# Patient Record
Sex: MALE | Race: WHITE | NOT HISPANIC OR LATINO | Employment: OTHER | URBAN - NONMETROPOLITAN AREA
[De-identification: names, ages, dates, MRNs, and addresses within clinical notes are randomized per-mention and may not be internally consistent; named-entity substitution may affect disease eponyms.]

---

## 2023-06-05 ENCOUNTER — OFFICE VISIT (OUTPATIENT)
Dept: PRIMARY CARE | Facility: CLINIC | Age: 68
End: 2023-06-05
Payer: COMMERCIAL

## 2023-06-05 VITALS
HEART RATE: 55 BPM | SYSTOLIC BLOOD PRESSURE: 120 MMHG | WEIGHT: 248 LBS | OXYGEN SATURATION: 96 % | DIASTOLIC BLOOD PRESSURE: 70 MMHG

## 2023-06-05 DIAGNOSIS — R35.0 URINARY FREQUENCY: Primary | ICD-10-CM

## 2023-06-05 DIAGNOSIS — L72.3 SEBACEOUS CYST: ICD-10-CM

## 2023-06-05 DIAGNOSIS — R35.1 NOCTURIA: ICD-10-CM

## 2023-06-05 PROBLEM — J44.9 COPD (CHRONIC OBSTRUCTIVE PULMONARY DISEASE) (MULTI): Status: ACTIVE | Noted: 2023-01-20

## 2023-06-05 PROBLEM — R73.09 ELEVATED GLUCOSE: Status: RESOLVED | Noted: 2023-01-20 | Resolved: 2023-06-05

## 2023-06-05 PROBLEM — S82.843A: Status: RESOLVED | Noted: 2022-10-18 | Resolved: 2023-06-05

## 2023-06-05 PROBLEM — R29.818 SUSPECTED SLEEP APNEA: Status: RESOLVED | Noted: 2023-01-20 | Resolved: 2023-06-05

## 2023-06-05 PROBLEM — Z96.9 RETAINED ORTHOPEDIC HARDWARE: Status: ACTIVE | Noted: 2023-01-25

## 2023-06-05 LAB
ALBUMIN (G/DL) IN SER/PLAS: 4.7 G/DL (ref 3.4–5)
ANION GAP IN SER/PLAS: 13 MMOL/L (ref 10–20)
CALCIUM (MG/DL) IN SER/PLAS: 10 MG/DL (ref 8.6–10.3)
CARBON DIOXIDE, TOTAL (MMOL/L) IN SER/PLAS: 25 MMOL/L (ref 21–32)
CHLORIDE (MMOL/L) IN SER/PLAS: 104 MMOL/L (ref 98–107)
CREATININE (MG/DL) IN SER/PLAS: 0.99 MG/DL (ref 0.5–1.3)
GFR MALE: 82 ML/MIN/1.73M2
GLUCOSE (MG/DL) IN SER/PLAS: 89 MG/DL (ref 74–99)
PHOSPHATE (MG/DL) IN SER/PLAS: 3 MG/DL (ref 2.5–4.9)
POC APPEARANCE, URINE: CLEAR
POC BILIRUBIN, URINE: NEGATIVE
POC BLOOD, URINE: NEGATIVE
POC COLOR, URINE: YELLOW
POC GLUCOSE, URINE: NEGATIVE MG/DL
POC KETONES, URINE: NEGATIVE MG/DL
POC LEUKOCYTES, URINE: NEGATIVE
POC NITRITE,URINE: NEGATIVE
POC PH, URINE: 6 PH
POC PROTEIN, URINE: NEGATIVE MG/DL
POC SPECIFIC GRAVITY, URINE: 1.02
POC UROBILINOGEN, URINE: 0.2 EU/DL
POTASSIUM (MMOL/L) IN SER/PLAS: 4.4 MMOL/L (ref 3.5–5.3)
SODIUM (MMOL/L) IN SER/PLAS: 138 MMOL/L (ref 136–145)
UREA NITROGEN (MG/DL) IN SER/PLAS: 13 MG/DL (ref 6–23)

## 2023-06-05 PROCEDURE — 99213 OFFICE O/P EST LOW 20 MIN: CPT | Performed by: FAMILY MEDICINE

## 2023-06-05 PROCEDURE — 1036F TOBACCO NON-USER: CPT | Performed by: FAMILY MEDICINE

## 2023-06-05 PROCEDURE — 1159F MED LIST DOCD IN RCRD: CPT | Performed by: FAMILY MEDICINE

## 2023-06-05 PROCEDURE — 84153 ASSAY OF PSA TOTAL: CPT

## 2023-06-05 PROCEDURE — 81002 URINALYSIS NONAUTO W/O SCOPE: CPT | Performed by: FAMILY MEDICINE

## 2023-06-05 PROCEDURE — 80069 RENAL FUNCTION PANEL: CPT

## 2023-06-05 RX ORDER — TAMSULOSIN HYDROCHLORIDE 0.4 MG/1
0.4 CAPSULE ORAL DAILY
Qty: 90 CAPSULE | Refills: 3 | Status: SHIPPED | OUTPATIENT
Start: 2023-06-05 | End: 2024-06-04

## 2023-06-05 ASSESSMENT — PATIENT HEALTH QUESTIONNAIRE - PHQ9
2. FEELING DOWN, DEPRESSED OR HOPELESS: NOT AT ALL
1. LITTLE INTEREST OR PLEASURE IN DOING THINGS: NOT AT ALL
SUM OF ALL RESPONSES TO PHQ9 QUESTIONS 1 AND 2: 0

## 2023-06-05 NOTE — PROGRESS NOTES
Subjective   Patient ID: Jeremy Ortiz is a 68 y.o. male who presents for prostate (Check spot on face).    HPI   Cyst left side face   Has been present at least 5 to 8 years  Slowly gotten bigger    DC smoking 8 months ago   Patient had trimalar fracture last fall he discontinued at that time when surgeon told him it would be best to do that for healing  Did not restart    Denies significant shortness of breath chest pain palpitations or chronic cough    Review of Systems    Objective   /70   Pulse 55   Wt 112 kg (248 lb)   SpO2 96%     Physical Exam  General: alert, no apparent distress, good hygiene   HEAD:  Normocephalic, atraumatic    EARS:  EAC patent, TMs normal,   EYES:  sclera white, ANDREW, conjunctiva noninjected  NOSE: Nasal passages patent   MOUTH: Pharynx clear, tongue uvula midline, grade 3 airway upper lower dentures  Neck:  supple, no masses, thyroid non enlarged non nodular, no cervical adenopathy,  Lungs:  no wheezing, no rales , no rhonchi, normal respiratory pattern, breath sounds not diminished  Heart:  regular rate and  rhythm, no murmur, no ectopy, no S3 or S4, no carotid bruits  Abdomen:  soft NT,BS + ,  no organomegaly, no masses, no bruits  Extremities:  no edema, no cyanosis, no clubbing,  2+ posterior tibialis pulse    Psych:  speech fluent, normal affect, normal thought process  Skin: Approximately 5 x 8 mm oblong cyst whitish color underneath left upper lateral cheek very much consistent with epidermal cyst skin overlying is normal although the thin  Assessment/Plan   Problem List Items Addressed This Visit       Nocturia    Relevant Medications    tamsulosin (Flomax) 0.4 mg 24 hr capsule    Other Relevant Orders    Prostate Specific Antigen    POCT UA (nonautomated) manually resulted (Completed)    Renal Function Panel (Completed)    Urinary frequency - Primary    Relevant Medications    tamsulosin (Flomax) 0.4 mg 24 hr capsule    Other Relevant Orders    Prostate Specific  Antigen    POCT UA (nonautomated) manually resulted (Completed)    Renal Function Panel (Completed)     Other Visit Diagnoses       Sebaceous cyst            Discussed just monitoring cyst is felt to be benign could be removed discussed what it would take.  Patient with very significant prostate symptoms.  Urinalysis check to make sure is not hematuria concern for bladder malignancy.  Negative for blood.  PSA done declined AUGUSTINA.  Joon treatment options if patient does not get good enough response from tamsulosin could add in finasteride he may call

## 2023-06-06 LAB — PROSTATE SPECIFIC AG (NG/ML) IN SER/PLAS: 0.43 NG/ML (ref 0–4)

## 2024-07-02 ENCOUNTER — OFFICE VISIT (OUTPATIENT)
Dept: PRIMARY CARE | Facility: CLINIC | Age: 69
End: 2024-07-02
Payer: COMMERCIAL

## 2024-07-02 VITALS
OXYGEN SATURATION: 95 % | BODY MASS INDEX: 38.51 KG/M2 | HEIGHT: 69 IN | WEIGHT: 260 LBS | HEART RATE: 70 BPM | SYSTOLIC BLOOD PRESSURE: 130 MMHG | DIASTOLIC BLOOD PRESSURE: 70 MMHG

## 2024-07-02 DIAGNOSIS — N39.43 BENIGN PROSTATIC HYPERPLASIA WITH POST-VOID DRIBBLING: Primary | ICD-10-CM

## 2024-07-02 DIAGNOSIS — N40.1 BENIGN PROSTATIC HYPERPLASIA WITH POST-VOID DRIBBLING: Primary | ICD-10-CM

## 2024-07-02 PROBLEM — J44.9 COPD (CHRONIC OBSTRUCTIVE PULMONARY DISEASE) (MULTI): Status: RESOLVED | Noted: 2023-01-20 | Resolved: 2024-07-02

## 2024-07-02 PROCEDURE — 1159F MED LIST DOCD IN RCRD: CPT

## 2024-07-02 PROCEDURE — 1160F RVW MEDS BY RX/DR IN RCRD: CPT

## 2024-07-02 PROCEDURE — 99213 OFFICE O/P EST LOW 20 MIN: CPT

## 2024-07-02 PROCEDURE — 1036F TOBACCO NON-USER: CPT

## 2024-07-02 RX ORDER — TAMSULOSIN HYDROCHLORIDE 0.4 MG/1
0.4 CAPSULE ORAL DAILY
Qty: 90 CAPSULE | Refills: 3 | Status: SHIPPED | OUTPATIENT
Start: 2024-07-02

## 2024-07-02 RX ORDER — TAMSULOSIN HYDROCHLORIDE 0.4 MG/1
0.4 CAPSULE ORAL DAILY
COMMUNITY
End: 2024-07-02 | Stop reason: SDUPTHER

## 2024-07-02 NOTE — PROGRESS NOTES
"Subjective   Patient ID: Jeremy Ortiz is a 69 y.o. male who presents for refill on Tamsulosin (Recommendation for spot on Lt side of cheek).  HPI  Jeremy presents for refill of Tamsulosin.   States he has been on tamsulosin for a year - last visit with Dr. Orourke was 6/5/23.  He states the medication has helped improve some of his symptoms but is still having difficulty urinating.   His symptoms are better with the medication than without, has stopped it for a period of time and symptoms were worse.   He reports bladder fullness, dribbling when urinating, increase in frequency, feeling like he hasn't emptied his bladder, waking up 6-8 times a night to urinate on the worst nights and 4-5 times on a good night.   Jennifer dysuria, hematuria.    He also mentions that he has a nodule on his left cheek.   States its been there for years but wanted to know if it needed to be removed.   The nodule doesn't bleed, it hasn't grown, it's not changing in color, the shape has stayed the same.  He is often outside and in the sun without using sunscreen. Pt denies any other new rashes, lesion, moles, nevi.     Review of Systems  A 10 point ROS was completed and reviewed. All are negative except what is stated above in the HPI.     Objective   /70   Pulse 70   Ht 1.753 m (5' 9\")   Wt 118 kg (260 lb)   SpO2 95%   BMI 38.40 kg/m²     Physical Exam  Constitutional:       General: He is not in acute distress.     Appearance: Normal appearance. He is not ill-appearing.   HENT:      Head: Normocephalic and atraumatic.      Nose: Nose normal.      Mouth/Throat:      Mouth: Mucous membranes are moist.   Eyes:      Extraocular Movements: Extraocular movements intact.      Conjunctiva/sclera: Conjunctivae normal.      Pupils: Pupils are equal, round, and reactive to light.   Cardiovascular:      Rate and Rhythm: Normal rate and regular rhythm.      Pulses: Normal pulses.      Heart sounds: Normal heart sounds.   Pulmonary:      " Effort: Pulmonary effort is normal.      Breath sounds: Normal breath sounds.   Abdominal:      General: Abdomen is flat. There is no distension.      Palpations: Abdomen is soft.      Tenderness: There is no abdominal tenderness.   Musculoskeletal:         General: Normal range of motion.      Cervical back: Normal range of motion.   Skin:     General: Skin is warm.      Comments: 1cm x 1cm nodule visualized on the superior lateral left cheek. Nodule is smooth, no ulceration, no sign of bleeding, no fluctuance, no discoloration.  No pain on palpation.    Neurological:      General: No focal deficit present.      Mental Status: He is alert.   Psychiatric:         Mood and Affect: Mood normal.         Behavior: Behavior normal.         Assessment/Plan   Problem List Items Addressed This Visit    None  Visit Diagnoses       Benign prostatic hyperplasia with post-void dribbling    -  Primary    Relevant Medications    tamsulosin (Flomax) 0.4 mg 24 hr capsule    Other Relevant Orders    Referral to Urology          BPH  Discussed he can take 2 tamsulosin and see if this improves symptoms - he will try   PSA was last checked -  6/5/23 - wnl  Recommend referral to urology     Sebaceous cyst   Recommend he continue to monitor the cyst   Discussed he could go to dermatology for removal - declines at this time   Per EMR - has had this cyst 5-8 years    Discussed at visit any disease processes that were of concern as well as the risks, benefits and instructions on any new medication provided. Patient (and/or caretaker of patient if present) stated all questions were answered, and they voiced understanding of instructions.